# Patient Record
Sex: MALE | Race: WHITE | Employment: STUDENT | URBAN - METROPOLITAN AREA
[De-identification: names, ages, dates, MRNs, and addresses within clinical notes are randomized per-mention and may not be internally consistent; named-entity substitution may affect disease eponyms.]

---

## 2022-08-23 ENCOUNTER — OFFICE VISIT (OUTPATIENT)
Dept: ORTHOPEDIC SURGERY | Age: 22
End: 2022-08-23
Payer: COMMERCIAL

## 2022-08-23 ENCOUNTER — HOSPITAL ENCOUNTER (OUTPATIENT)
Dept: PHYSICAL THERAPY | Age: 22
Setting detail: THERAPIES SERIES
Discharge: HOME | End: 2022-08-23
Payer: COMMERCIAL

## 2022-08-23 VITALS — BODY MASS INDEX: 22.84 KG/M2 | WEIGHT: 178 LBS | HEIGHT: 74 IN

## 2022-08-23 DIAGNOSIS — S83.511A COMPLETE TEAR, KNEE, ANTERIOR CRUCIATE LIGAMENT, RIGHT, INITIAL ENCOUNTER: ICD-10-CM

## 2022-08-23 DIAGNOSIS — M25.561 RIGHT KNEE PAIN, UNSPECIFIED CHRONICITY: Primary | ICD-10-CM

## 2022-08-23 DIAGNOSIS — S83.206A ACUTE MENISCAL TEAR OF RIGHT KNEE, INITIAL ENCOUNTER: ICD-10-CM

## 2022-08-23 PROCEDURE — 97161 PT EVAL LOW COMPLEX 20 MIN: CPT | Performed by: PHYSICAL THERAPIST

## 2022-08-23 PROCEDURE — 97110 THERAPEUTIC EXERCISES: CPT | Performed by: PHYSICAL THERAPIST

## 2022-08-23 PROCEDURE — 99204 OFFICE O/P NEW MOD 45 MIN: CPT | Performed by: ORTHOPAEDIC SURGERY

## 2022-08-23 PROCEDURE — 97530 THERAPEUTIC ACTIVITIES: CPT | Performed by: PHYSICAL THERAPIST

## 2022-08-23 NOTE — PROGRESS NOTES
[facility]  History and Physical      Date:  2022    Name:  Xochilt Arroyo  Address:  [address] [place] [address]    :  2000      Age:   25 y.o. Chief Complaint    Knee Pain (Right knee injury, )      History of Present Illness:  Xochilt Arroyo is a 25 y.o. male who presents for second opinion for retear right knee ACL. Patient initially sustained right ACL tear 2021 with subsequent fixation by Dr. Shaneka Moreno in [facility] utilizing a hamstring autograft. Surgery was isolated to ACL without meniscal injury. He underwent rehab and return to play 2022. Second injury was sustained  the following day after return to play. Both injuries were noncontact. Patient admits to immediate swelling and pain. MRI was obtained by Dr. Natalia Almanza which showed re-tear of the ACL graft. The patient denies any catching, joint locking, numbness, paresthesias, or weakness. Pain Assessment  Location of Pain: Knee  Location Modifiers: Right  Severity of Pain: 1  Quality of Pain: Dull  Duration of Pain: A few minutes  Frequency of Pain: Intermittent  Aggravating Factors: Standing, Exercise  Limiting Behavior: Some  Relieving Factors: Rest, Nsaids  Result of Injury: No  Work-Related Injury: No  Are there other pain locations you wish to document?: No    No past medical history on file. No past surgical history on file. No family history on file. Social History     Socioeconomic History    Marital status: Single       No current outpatient medications on file. No current facility-administered medications for this visit. No Known Allergies      Review of Systems:  A 14 point review of systems was completed by the patient and is available in the media section of the scanned medical record and was reviewed.         Vital Signs:   Ht 6' 2\" (1.88 m)   Wt 178 lb (80.7 kg)   BMI 22.85 kg/m²     General Exam: compartments exhibit symmetric joint space bilaterally. There's congruent joint spacing in the patellofemoral joint. Patella is riding appropriately in the trochlear groove with no subluxation or tilt noted. Elan-Adebayo ratio of 1. No osteophytes or bone growths noted. No loose bodies or foreign bodies. Prior femoral and tibial tunnels identified with button fixation. Office Procedures:  Orders Placed This Encounter   Procedures    XR KNEE RIGHT (3 VIEWS)     Standing Status:   Future     Number of Occurrences:   1     Standing Expiration Date:   8/19/2023     Order Specific Question:   Reason for exam:     Answer:   right knee pain            Assessment: Patient is a 25 y.o. male with right knee ACL retear and medial meniscus tear    Encounter Diagnoses   Name Primary? Right knee pain, unspecified chronicity Yes    Complete tear, knee, anterior cruciate ligament, right, initial encounter     Acute meniscal tear of right knee, initial encounter        No orders of the defined types were placed in this encounter. Medical decision making:  Patient's workup and evaluation were reviewed with the patient in the office today. Imaging was reviewed with the patient. Lengthy discussion was had with the patient and family regarding ACL reconstruction  Risks and benefits of the surgical procedure were discussed at length with patient and family understanding that due to the nature of revision surgery there is increased risk of failure at approximately 10%  They understand that there is an increased risk that patient knee may need multiple surgical interventions with potential bone grafting of the previous tunnels  We discussed the need for meniscal repair as well as lateral extra-articular tenodesis  Extensive rehab was discussed utilizing BFR therapy from a preoperative standpoint.   We discussed the need for postoperative physical therapy and that the patient would need to be present for 6 weeks postop. We discussed recreational activities and what the goals of surgery and expectations would be. 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 Risks of surgery were discussed and included but were not limited to stiffness, neurovascular injury, numbness, blood clots, retear     All the patient's questions were answered while in the clinic. The patient is understanding of all instructions and agrees with the plan. Treatment Plan:    Patient will begin BFR therapy. He is currently a college student in [employer] and will continue his therapy through the semester. Decisions will be made regarding surgical timing with a goal for January. Patient will update regarding the schedule  Activity modification/Rest   Ice 20 minutes ever 1-2 hours PRN  Take medications as prescribed/instructed  Elevation   Lightweight exercise/low impact exercise  Appropriate diet/weight management      Follow up: We will follow-up patient as needed regarding timing of surgery    This patient exhibits moderate complexity for obtaining an independent history, reviewing past medical records, independent interpretation of diagnostic imaging, and further coordinating care. The patient exhibits moderate risk secondary to revision surgery and concomitant knee injuries. Approximately 45 minutes were spent reviewing past medical records, imaging, educating the patient, and coordinating care. Gin Negrete D.O. Orthopedic Surgeon, [facility] Fellow    08/23/22 11:01 AM        This dictation was performed with a verbal recognition program (DRAGON) and it was checked for errors. It is possible that there are still dictated errors within this office note. If so, please bring any errors to my attention for an addendum. All efforts were made to ensure that this office note is accurate. This dictation was performed with a verbal recognition program (DRAGON) and it was checked for errors.   It is possible that there are still dictated errors within this office note. If so, please bring any errors to my attention for an addendum. All efforts were made to ensure that this office note is accurate. Supervising Physician Attestation:  I, Dr. Aquiles Rosas, personally performed the services described in this documentation as scribed above, and it is both accurate and complete and I agree with all pertinent clinical information. I personally interviewed the patient and performed a physical examination and medical decision making. I discussed the patient's condition and treatment options and have  reviewed and agree with the past medical, family and social history unless otherwise noted. All of the patient's questions were answered.   I supervised the orthopedic sports medicine fellow in the evaluation and treatment plan for this patient      Board Certified Orthopaedic Surgeon  [facility] [employer]iah [employer] and [address] [employer]  Professor of Dangelo Macias, Department of [address] [place] [address]

## 2022-08-23 NOTE — PLAN OF CARE
[facility] and [address],  Sports Performance and Rehabilitation, [facility] [address]  [facility],[address]   Diane Grubbs  Phone: [phone]  Fax: [phone]       Physical Therapy Certification    Dear  Dr Alejandro Coronado,    We had the pleasure of evaluating the following patient for physical therapy services at [address] [place] [address]. A summary of our findings can be found in the initial assessment below. This includes our plan of care. If you have any questions or concerns regarding these findings, please do not hesitate to contact me at the office phone number checked above. Thank you for the referral.       Physician Signature:_______________________________Date:__________________  By signing above (or electronic signature), therapists plan is approved by physician      Patient: Hermes Byrne   : 2000   MRN: [id number]  Referring Physician:  Mika Maloney      Evaluation Date: 2022      Medical Diagnosis Information:  Diagnosis: S83. 12A Sprain of anterior cruciate ligament of left knee, initial encounter   Treatment Diagnosis: M25.562 pain in left knee                                         Insurance information:  TBD     Precautions/ Contra-indications: none    C-SSRS Triggered by Intake questionnaire (Past 2 wk assessment):   [x] No, Questionnaire did not trigger screening.   [] Yes, Patient intake triggered further evaluation      [] C-SSRS Screening completed  [] PCP notified via Plan of Care  [] Emergency services notified     Latex Allergy:  [x]NO      []YES  Preferred Language for Healthcare:   [x]English       []other:    SUBJECTIVE: Patient stated complaint:  Patient presented to Dr Alejandro Coronado today for a second opinion for retear of his right knee ACL. Patient initially sustained right ACL tear 2021 with subsequent fixation by Dr. Rani Guzman in [facility] utilizing a hamstring autograft.   Surgery was isolated to ACL without meniscal injury. He underwent rehab and returned to play July 27, 2022. Reports no return to play criteria was utilized. Second injury was sustained July 28 the following day after return to play. Both injuries were noncontact. Patient admits to immediate swelling and pain. MRI was obtained by Dr. Anatoly Mejia which showed re-tear of the ACL graft. The patient denies any catching, joint locking, numbness, paresthesias, or weakness. Reports no buckling or giving way or the knee at this time. X-rays today were normal.  He is intending to undergo ACL revision surgery (BTB) with LET procedure in January of 2023, however this is subject to change. 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 He would like to return to recreational soccer play at some point. He also likes to ski and golf recreationally. He intends to go to medical school at [address] [place] [address] next year.        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    Living Status/Prior Level of Function: Independent with ADLs and IADLs, soccer, skiing, golf    OBJECTIVE:      Flexibility L R Comment   Hamstring -     Gastroc -     ITB -     Quad              ROM PROM AROM Overpressure Comment    L R L R L R    Flexion   135 148      Extension   +5 +8                              Strength L R Comment   Quad 4+     Hamstring 4+     Gastroc      Hip  flexion 4+     Hip abd 4+           Quad  5 5 REGINA 0       Special Test Results/Comment   Meniscal Click -   Crepitus -   Flexion Test    Valgus Laxity -   Varus Laxity -   Lachmans +   Drop Back -   Homans    Dial -       Girth L R   Mid Patella 38.0cm 39.5cm   Suprapatellar 38.2cm 39.5cm   5cm above 40.7cm 41.9cm   15cm above       Reflexes/Sensation:    []Dermatomes/Myotomes intact    []Reflexes equal and normal bilaterally   []Other:    Joint mobility: PFJ    [x]Normal    []Hypo   []Hyper    Palpation: not tested    Functional Mobility/Transfers: independent    Posture: normal    Bandages/Dressings/Incisions: NA    Gait: (include devices/WB status) normal gait without AD    Orthopedic Special Tests: Beighton scale: + B for all components                       [x] Patient history, allergies, meds reviewed. Medical chart reviewed. See intake form. Review Of Systems (ROS):  [x]Performed Review of systems (Integumentary, CardioPulmonary, Neurological) by intake and observation. Intake form has been scanned into medical record. Patient has been instructed to contact their primary care physician regarding ROS issues if not already being addressed at this time.       Co-morbidities/Complexities (which will affect course of rehabilitation):   []None           Arthritic conditions   []Rheumatoid arthritis (M05.9)  []Osteoarthritis (M19.91)   Cardiovascular conditions   []Hypertension (I10)  []Hyperlipidemia (E78.5)  []Angina pectoris (I20)  []Atherosclerosis (I70)   Musculoskeletal conditions   []Disc pathology   []Congenital spine pathologies   [x]Prior surgical intervention  []Osteoporosis (M81.8)  []Osteopenia (M85.8)   Endocrine conditions   []Hypothyroid (E03.9)  []Hyperthyroid Gastrointestinal conditions   []Constipation (F18.34)   Metabolic conditions   []Morbid obesity (E66.01)  []Diabetes type 1(E10.65) or 2 (E11.65)   []Neuropathy (G60.9)     Pulmonary conditions   []Asthma (J45)  []Coughing   []COPD (J44.9)   Psychological Disorders  []Anxiety (F41.9)  []Depression (F32.9)   []Other:   []Other:          Barriers to/and or personal factors that will affect rehab potential:              []Age  []Sex              []Motivation/Lack of Motivation                        []Co-Morbidities              []Cognitive Function, education/learning barriers              []Environmental, home barriers              []profession/work barriers  []past PT/medical experience  []other:  Justification:     Falls Risk Assessment (30 days):   [x] Falls Risk assessed and no intervention required. [] Falls Risk assessed and Patient requires intervention due to being higher risk   TUG score (>12s at risk):     [] Falls education provided, including         ASSESSMENT:   Functional Impairments:     []Noted lumbar/proximal hip/LE hypomobility   [x]Decreased LE functional ROM   [x]Decreased core/proximal hip strength and neuromuscular control   [x]Decreased LE functional strength   [x]Reduced balance/proprioceptive control   []other:      Functional Activity Limitations (from functional questionnaire and intake)   []Reduced ability to tolerate prolonged functional positions   []Reduced ability or difficulty with changes of positions or transfers between positions   [x]Reduced ability to maintain good posture and demonstrate good body mechanics with sitting, bending, and lifting   []Reduced ability to sleep   [] Reduced ability or tolerance with driving and/or computer work   [x]Reduced ability to perform lifting, carrying tasks   [x]Reduced ability to squat   []Reduced ability to forward bend   [x]Reduced ability to ambulate prolonged functional periods/distances/surfaces   [x]Reduced ability to ascend/descend stairs   [x]Reduced ability to run, hop or jump   []other:     Participation Restrictions   []Reduced participation in self care activities   []Reduced participation in home management activities   []Reduced participation in work activities   [x]Reduced participation in social activities. [x]Reduced participation in sport activities.     Classification :    []Signs/symptoms consistent with post-surgical status including decreased ROM, strength and function. []Signs/symptoms consistent with joint sprain/strain   []Signs/symptoms consistent with patella-femoral syndrome   []Signs/symptoms consistent with knee OA/hip OA   [x]Signs/symptoms consistent with internal derangement of knee/Hip   []Signs/symptoms consistent with functional hip weakness/NMR control      []Signs/symptoms consistent with tendinitis/tendinosis    []signs/symptoms consistent with pathology which may benefit from Dry needling      []other:      Prognosis/Rehab Potential:      []Excellent   [x]Good    []Fair   []Poor    Tolerance of evaluation/treatment:    []Excellent   [x]Good    []Fair   []Poor    Physical Therapy Evaluation Complexity Justification  [x] A history of present problem with:  [] no personal factors and/or comorbidities that impact the plan of care;  [x]1-2 personal factors and/or comorbidities that impact the plan of care  []3 personal factors and/or comorbidities that impact the plan of care  [x] An examination of body systems using standardized tests and measures addressing any of the following: body structures and functions (impairments), activity limitations, and/or participation restrictions;:  [] a total of 1-2 or more elements   [x] a total of 3 or more elements   [] a total of 4 or more elements   [x] A clinical presentation with:  [x] stable and/or uncomplicated characteristics   [] evolving clinical presentation with changing characteristics  [] unstable and unpredictable characteristics;   [x] Clinical decision making of [x] low, [] moderate, [] high complexity using standardized patient assessment instrument and/or measurable assessment of functional outcome.     [x] EVAL (LOW) 26468 (typically 20 minutes face-to-face)  [] EVAL (MOD) 92411 (typically 30 minutes face-to-face)  [] EVAL (HIGH) 18520 (typically 45 minutes face-to-face)  [] RE-EVAL       PLAN  Frequency/Duration:  1 days per week for 6 Weeks:  Interventions:  [x]  Therapeutic exercise including: strength training, ROM, for Lower extremity and core   [x]  NMR activation and proprioception for LE, Glutes and Core   [x]  Manual therapy as indicated for LE, Hip and spine to include: Dry Needling/IASTM, STM, PROM, Gr I-IV mobilizations, manipulation. [x] Modalities as needed that may include: thermal agents, E-stim, Biofeedback, US, iontophoresis as indicated  [x] Patient education on joint protection, postural re-education, activity modification, progression of HEP. HEP instruction:     Access Code: [email or web address]  URL: [email or web address]  Date: 08/23/2022  Prepared by: Waldo Jeannine    Exercises  Seated Table Hamstring Stretch - 2 x daily - 7 x weekly - 1 sets - 5 reps - 30 hold  Long Sitting Calf Stretch with Strap - 2 x daily - 7 x weekly - 1 sets - 5 reps - 30 hold  Small Range Straight Leg Raise - 1 x daily - 7 x weekly - 3 sets - 10 reps  Sidelying Hip Abduction - 1 x daily - 7 x weekly - 3 sets - 10 reps  Clamshell with Resistance - 1 x daily - 7 x weekly - 3 sets - 10 reps  Seated Long Arc Quad - 1 x daily - 7 x weekly - 3 sets - 10 reps  Standing Knee Flexion AROM with Chair Support - 1 x daily - 7 x weekly - 3 sets - 10 reps  Ice - 2 x daily - 7 x weekly - 15 minutes hold      GOALS:   Patient stated goal: meet pre-op muscle maintenance goals    [] Progressing: [] Met: [] Not Met: [] Adjusted    Therapist goals for Patient:   Short Term Goals: To be achieved in: 2 weeks  1. Independent in HEP and progression per patient tolerance, in order to prevent re-injury. [] Progressing: [] Met: [] Not Met: [] Adjusted   2. Patient will have a decrease in pain to facilitate improvement in movement, function, and ADLs as indicated by Functional Deficits. [] Progressing: [] Met: [] Not Met: [] Adjusted    Long Term Goals: To be achieved in: 6 weeks  1.  Disability index score of 25% or less for the LEFS pre-op to assist with reaching prior level of function. [] Progressing: [] Met: [] Not Met: [] Adjusted  2. Patient will demonstrate increased AROM to 0-140 to allow for proper joint functioning as indicated by patients Functional Deficits. [] Progressing: [] Met: [] Not Met: [] Adjusted  3. Patient will demonstrate an increase in Strength to good proximal hip strength and control, within 5lb HHD in LE to allow for proper functional mobility as indicated by patients Functional Deficits. [] Progressing: [] Met: [] Not Met: [] Adjusted  4. Patient will return to all functional activities without increased symptoms or restriction. [] Progressing: [] Met: [] Not Met: [] Adjusted       Electronically signed by:  Fabiola Vora PT      Note: If patient does not return for scheduled/ recommended follow up visits, this note will serve as a discharge from care along with most recent update on progress.

## 2022-08-30 ENCOUNTER — TELEPHONE (OUTPATIENT)
Dept: ORTHOPEDIC SURGERY | Age: 22
End: 2022-08-30

## 2022-08-30 NOTE — TELEPHONE ENCOUNTER
Surgery and/or Procedure Scheduling     Contact Name: Ling Crawford Request: [address]  Patient Contact Number: [phone]

## 2022-09-26 ENCOUNTER — TELEPHONE (OUTPATIENT)
Dept: ORTHOPEDIC SURGERY | Age: 22
End: 2022-09-26

## 2022-09-26 DIAGNOSIS — S83.206A ACUTE MENISCAL TEAR OF RIGHT KNEE, INITIAL ENCOUNTER: Primary | ICD-10-CM

## 2022-09-26 DIAGNOSIS — S83.511A COMPLETE TEAR, KNEE, ANTERIOR CRUCIATE LIGAMENT, RIGHT, INITIAL ENCOUNTER: ICD-10-CM

## 2022-09-26 DIAGNOSIS — M25.561 RIGHT KNEE PAIN, UNSPECIFIED CHRONICITY: ICD-10-CM

## 2022-09-26 NOTE — TELEPHONE ENCOUNTER
General Question     Subject: CANCELLING SX  Patient and /or Facility Request: NARGIS COLUNGA  Contact Number: [phone]    PATIENT MOM IS CALLING TO CANCEL SX FOR NOW. PLEAS CALL HER REGARDING THIS MATTER AT THE NUMBER ABOVE.

## 2022-10-07 ENCOUNTER — TELEPHONE (OUTPATIENT)
Dept: ORTHOPEDIC SURGERY | Age: 22
End: 2022-10-07

## 2022-10-07 NOTE — TELEPHONE ENCOUNTER
Auth: [employer]  Date: 10/10/22  Reference # None  Spoke with: Ginna  Type of SX: Outpatient  Location: [facility]  CPT: [id number]  DX: [phone], S83.206A  SX area: Rt knee  Insurance: [employer]